# Patient Record
Sex: MALE | Race: WHITE
[De-identification: names, ages, dates, MRNs, and addresses within clinical notes are randomized per-mention and may not be internally consistent; named-entity substitution may affect disease eponyms.]

---

## 2017-02-27 NOTE — NUR
Pacemaker Information

Medtronic-Junior QUEVEDO Pacemaker

Model # [id number] Serial # [id number]

Implanted 10-27-14

Dr Stephen Silver at [phone]

[employer] [phone]

## 2017-02-27 NOTE — NUR
PMH, allergies, meds

reviewed and documented.  Preop and DOS instructions given including handouts of medications 
to stop before surgery, shower instructions and Hibiclens soap, letter from Dr Hernández, ortho 
consent, Surgical Services pamphlet, and my contact information.

## 2017-03-07 NOTE — NUR
JOINT REPLACEMENT PREOP CLASS

PATIENT ATTENDED  JOINT REPLACEMENT PREOP CLASS.  CASE MANAGEMENT CONTACT INFORMATION 
PROVIDED. EDUCATION WAS PROVIDED REGARDING WHAT TO EXPECT BEFORE, DURING AND AFTER SURGERY.  
INCLUDING:

OVERVIEW OF ANATOMY AND PHYSIOLOGY

HOSPITAL TREATMENT SCHEDULE

THERAPY DEMONSTRATION

CASE MANAGEMENT RESPONSIBILITIES

DISCHARGE PLANNING

EQUIPMENT NEEDS

JOINT REPLACEMENT WORKBOOK

ANTI-COAGULATION

SURGERY STRONG NUTRITIONAL PROTOCOL

DISCHARGE INSTRUCTIONS

[facility] PATIENT PORTAL, WITH INSTRUCTIONS

CJR AND PREOP SURVERY

PREOP BATHING- CHG GIVEN

ALL PATIENT'S QUESTIONS ANSWERED TO THEIR SATISFACTION.  PATIENTS AND COACHES ENCOURAGED TO 
CALL WITH ANY ADDITIONAL QUESTIONS OR CONCERNS.  CM FOLLOWING FOR TRANSITIONAL CARE PLANNING 
NEEDS DURING HOSPITALIZATION.

## 2017-03-28 ENCOUNTER — HOSPITAL ENCOUNTER (INPATIENT)
Dept: HOSPITAL 37 - SRG | Age: 82
LOS: 1 days | Discharge: HOME | DRG: 470 | End: 2017-03-29
Attending: ORTHOPAEDIC SURGERY | Admitting: ORTHOPAEDIC SURGERY
Payer: MEDICARE

## 2017-03-28 VITALS
HEART RATE: 67 BPM | DIASTOLIC BLOOD PRESSURE: 65 MMHG | SYSTOLIC BLOOD PRESSURE: 115 MMHG | RESPIRATION RATE: 16 BRPM | OXYGEN SATURATION: 95 %

## 2017-03-28 VITALS — DIASTOLIC BLOOD PRESSURE: 65 MMHG | OXYGEN SATURATION: 95 % | SYSTOLIC BLOOD PRESSURE: 138 MMHG | HEART RATE: 61 BPM

## 2017-03-28 VITALS
TEMPERATURE: 96.3 F | SYSTOLIC BLOOD PRESSURE: 121 MMHG | HEART RATE: 65 BPM | OXYGEN SATURATION: 92 % | DIASTOLIC BLOOD PRESSURE: 60 MMHG

## 2017-03-28 VITALS
RESPIRATION RATE: 16 BRPM | HEART RATE: 60 BPM | SYSTOLIC BLOOD PRESSURE: 91 MMHG | DIASTOLIC BLOOD PRESSURE: 50 MMHG | OXYGEN SATURATION: 98 %

## 2017-03-28 VITALS
HEART RATE: 64 BPM | DIASTOLIC BLOOD PRESSURE: 54 MMHG | OXYGEN SATURATION: 98 % | RESPIRATION RATE: 16 BRPM | SYSTOLIC BLOOD PRESSURE: 96 MMHG

## 2017-03-28 VITALS — HEART RATE: 67 BPM | RESPIRATION RATE: 16 BRPM

## 2017-03-28 VITALS
HEART RATE: 61 BPM | SYSTOLIC BLOOD PRESSURE: 89 MMHG | OXYGEN SATURATION: 100 % | TEMPERATURE: 97 F | RESPIRATION RATE: 16 BRPM | DIASTOLIC BLOOD PRESSURE: 52 MMHG

## 2017-03-28 VITALS — OXYGEN SATURATION: 95 % | HEART RATE: 62 BPM | SYSTOLIC BLOOD PRESSURE: 107 MMHG | DIASTOLIC BLOOD PRESSURE: 67 MMHG

## 2017-03-28 VITALS
OXYGEN SATURATION: 95 % | HEART RATE: 72 BPM | DIASTOLIC BLOOD PRESSURE: 53 MMHG | RESPIRATION RATE: 20 BRPM | SYSTOLIC BLOOD PRESSURE: 97 MMHG

## 2017-03-28 VITALS — DIASTOLIC BLOOD PRESSURE: 71 MMHG | HEART RATE: 65 BPM | SYSTOLIC BLOOD PRESSURE: 125 MMHG | OXYGEN SATURATION: 94 %

## 2017-03-28 VITALS
HEART RATE: 66 BPM | DIASTOLIC BLOOD PRESSURE: 53 MMHG | RESPIRATION RATE: 15 BRPM | SYSTOLIC BLOOD PRESSURE: 97 MMHG | OXYGEN SATURATION: 99 %

## 2017-03-28 VITALS
OXYGEN SATURATION: 96 % | DIASTOLIC BLOOD PRESSURE: 77 MMHG | TEMPERATURE: 97.7 F | RESPIRATION RATE: 14 BRPM | SYSTOLIC BLOOD PRESSURE: 160 MMHG | HEART RATE: 82 BPM

## 2017-03-28 VITALS — RESPIRATION RATE: 16 BRPM | OXYGEN SATURATION: 97 % | HEART RATE: 68 BPM

## 2017-03-28 VITALS
OXYGEN SATURATION: 98 % | DIASTOLIC BLOOD PRESSURE: 55 MMHG | RESPIRATION RATE: 16 BRPM | HEART RATE: 64 BPM | TEMPERATURE: 97.5 F | SYSTOLIC BLOOD PRESSURE: 120 MMHG

## 2017-03-28 VITALS
HEART RATE: 62 BPM | DIASTOLIC BLOOD PRESSURE: 51 MMHG | OXYGEN SATURATION: 99 % | SYSTOLIC BLOOD PRESSURE: 94 MMHG | RESPIRATION RATE: 22 BRPM

## 2017-03-28 VITALS
SYSTOLIC BLOOD PRESSURE: 100 MMHG | RESPIRATION RATE: 22 BRPM | HEART RATE: 61 BPM | OXYGEN SATURATION: 98 % | DIASTOLIC BLOOD PRESSURE: 52 MMHG

## 2017-03-28 VITALS
HEART RATE: 66 BPM | OXYGEN SATURATION: 100 % | RESPIRATION RATE: 19 BRPM | SYSTOLIC BLOOD PRESSURE: 93 MMHG | DIASTOLIC BLOOD PRESSURE: 55 MMHG

## 2017-03-28 VITALS
BODY MASS INDEX: 24.04 KG/M2 | WEIGHT: 177.47 LBS | HEIGHT: 72 IN | BODY MASS INDEX: 24.04 KG/M2 | WEIGHT: 177.47 LBS | HEIGHT: 72 IN

## 2017-03-28 VITALS
RESPIRATION RATE: 20 BRPM | OXYGEN SATURATION: 96 % | HEART RATE: 64 BPM | DIASTOLIC BLOOD PRESSURE: 59 MMHG | SYSTOLIC BLOOD PRESSURE: 109 MMHG

## 2017-03-28 VITALS — SYSTOLIC BLOOD PRESSURE: 141 MMHG | DIASTOLIC BLOOD PRESSURE: 69 MMHG | OXYGEN SATURATION: 96 % | HEART RATE: 62 BPM

## 2017-03-28 VITALS
SYSTOLIC BLOOD PRESSURE: 94 MMHG | DIASTOLIC BLOOD PRESSURE: 55 MMHG | RESPIRATION RATE: 14 BRPM | HEART RATE: 63 BPM | OXYGEN SATURATION: 96 %

## 2017-03-28 VITALS
SYSTOLIC BLOOD PRESSURE: 112 MMHG | RESPIRATION RATE: 16 BRPM | OXYGEN SATURATION: 93 % | DIASTOLIC BLOOD PRESSURE: 65 MMHG | HEART RATE: 73 BPM

## 2017-03-28 VITALS — OXYGEN SATURATION: 98 % | RESPIRATION RATE: 20 BRPM | HEART RATE: 64 BPM

## 2017-03-28 VITALS
OXYGEN SATURATION: 100 % | HEART RATE: 63 BPM | DIASTOLIC BLOOD PRESSURE: 58 MMHG | RESPIRATION RATE: 20 BRPM | SYSTOLIC BLOOD PRESSURE: 90 MMHG

## 2017-03-28 VITALS — HEART RATE: 61 BPM | SYSTOLIC BLOOD PRESSURE: 126 MMHG | DIASTOLIC BLOOD PRESSURE: 67 MMHG | OXYGEN SATURATION: 92 %

## 2017-03-28 VITALS
SYSTOLIC BLOOD PRESSURE: 84 MMHG | RESPIRATION RATE: 18 BRPM | OXYGEN SATURATION: 99 % | HEART RATE: 62 BPM | DIASTOLIC BLOOD PRESSURE: 54 MMHG

## 2017-03-28 VITALS — HEART RATE: 65 BPM | DIASTOLIC BLOOD PRESSURE: 66 MMHG | SYSTOLIC BLOOD PRESSURE: 149 MMHG | OXYGEN SATURATION: 94 %

## 2017-03-28 VITALS — TEMPERATURE: 97 F

## 2017-03-28 DIAGNOSIS — I12.9: ICD-10-CM

## 2017-03-28 DIAGNOSIS — E05.90: ICD-10-CM

## 2017-03-28 DIAGNOSIS — I48.2: ICD-10-CM

## 2017-03-28 DIAGNOSIS — E78.5: ICD-10-CM

## 2017-03-28 DIAGNOSIS — Z95.0: ICD-10-CM

## 2017-03-28 DIAGNOSIS — N18.1: ICD-10-CM

## 2017-03-28 DIAGNOSIS — M16.11: Primary | ICD-10-CM

## 2017-03-28 DIAGNOSIS — K21.9: ICD-10-CM

## 2017-03-28 DIAGNOSIS — J30.9: ICD-10-CM

## 2017-03-28 DIAGNOSIS — Z87.891: ICD-10-CM

## 2017-03-28 LAB
ALBUMIN/GLOBULIN RATIO: 1.1 RATIO (ref 1.1–2.2)
ALBUMIN: 3.9 G/DL (ref 3.5–5)
ALP SERPL-CCNC: 91 U/L (ref 38–126)
ALT (SGPT): 34 U/L (ref 21–72)
ANION GAP: 8 MEQ/L (ref 5–15)
AST (SGOT): 24 U/L (ref 17–59)
BASOPHILS # (AUTO): 0.1 T/MM3 (ref 0–0.2)
BASOPHILS % (AUTO): 1 % (ref 0–2)
BILIRUBIN,TOTAL: 1.1 MG/DL (ref 0.2–1.3)
BUN SERPL-MCNC: 34 MG/DL (ref 9–20)
BUN/CREATININE RATIO: 20 RATIO (ref 6–26)
CALCIUM SERPL-MCNC: 9.6 MG/DL (ref 8.4–10.2)
CHLORIDE SERPL-SCNC: 109 MEQ/L (ref 98–107)
CO2 - CARBON DIOXIDE: 27 MEQ/L (ref 22–30)
CREAT SERPL-MCNC: 1.7 MG/DL (ref 0.8–1.5)
EOSINOPHILS # (AUTO): 0.2 T/MM3 (ref 0–0.5)
EOSINOPHILS % (AUTO): 2.2 % (ref 0–4)
GFR SERPL CREATININE-BSD FRML MDRD: 39 ML/MIN/{1.73_M2}
GLOBULIN: 3.4 G/DL (ref 2.4–3.6)
GLUCOSE SERPL-MCNC: 94 MG/DL (ref 75–110)
HCT - HEMATOCRIT: 40.4 % (ref 41–53)
HGB - HEMOGLOBIN: 13.1 GM/DL (ref 13.5–17.5)
IMMATURE GRANULOCYTE # (AUTO): 0.03 T/MM3 (ref 0–0.03)
IMMATURE GRANULOCYTE % (AUTO): 0.4 % (ref 0–0.5)
LYMPHOCYTES # (AUTO): 1.5 T/MM3 (ref 1–4.8)
LYMPHOCYTES % (AUTO): 22.8 % (ref 23–45)
MEAN CORPUSCULAR HGB CONC(MCHC: 32.4 GM/DL (ref 31–37)
MEAN CORPUSCULAR HGB: 31.5 UUG (ref 26–34)
MEAN CORPUSCULAR VOLUME: 97.1 UM3 (ref 80–100)
MEAN PLATELET VOLUME: 12.1 UM3 (ref 9.4–12.4)
MONOCYTES # (AUTO): 0.6 T/MM3 (ref 0–0.8)
MONOCYTES % (AUTO): 9.3 % (ref 0–9)
NEUTROPHILS #(AUTO)-ABSOLUTE: 4.3 T/MM3 (ref 1.8–7.7)
NEUTROPHILS NFR BLD AUTO: 64.3 % (ref 33–66)
POTASSIUM: 4.6 MEQ/L (ref 3.6–5)
PROT SERPL-MCNC: 7.3 G/DL (ref 6.3–8.2)
RDW STANDARD DEVIATION: 41 FL (ref 36.9–50.2)
RED BLOOD COUNT: 4.16 M/MM3 (ref 4.5–5.9)
SODIUM SERPL-SCNC: 144 MEQ/L (ref 134–144)
WBC - WHITE BLOOD COUNT: 6.7 T/MM3 (ref 4.5–11)

## 2017-03-28 PROCEDURE — 80053 COMPREHEN METABOLIC PANEL: CPT

## 2017-03-28 PROCEDURE — 80048 BASIC METABOLIC PNL TOTAL CA: CPT

## 2017-03-28 PROCEDURE — 85025 COMPLETE CBC W/AUTO DIFF WBC: CPT

## 2017-03-28 PROCEDURE — 0SR902A REPLACEMENT OF RIGHT HIP JOINT WITH METAL ON POLYETHYLENE SYNTHETIC SUBSTITUTE, UNCEMENTED, OPEN APPROACH: ICD-10-PCS | Performed by: ORTHOPAEDIC SURGERY

## 2017-03-28 PROCEDURE — 85027 COMPLETE CBC AUTOMATED: CPT

## 2017-03-28 PROCEDURE — 36415 COLL VENOUS BLD VENIPUNCTURE: CPT

## 2017-03-28 RX ADMIN — ACETAMINOPHEN SCH MG: 325 TABLET, FILM COATED ORAL at 20:36

## 2017-03-28 RX ADMIN — Medication SCH MG: at 20:36

## 2017-03-28 RX ADMIN — CLINDAMYCIN PHOSPHATE SCH MLS/HR: 18 INJECTION, SOLUTION INTRAVENOUS at 18:58

## 2017-03-28 RX ADMIN — Medication SCH EACH: at 20:47

## 2017-03-28 RX ADMIN — FLUTICASONE PROPIONATE SCH SPRAY: 50 SPRAY, METERED NASAL at 20:47

## 2017-03-28 RX ADMIN — DOCUSATE CALCIUM SCH MG: 240 CAPSULE, LIQUID FILLED ORAL at 20:38

## 2017-03-28 RX ADMIN — ACETAMINOPHEN SCH MG: 325 TABLET, FILM COATED ORAL at 17:55

## 2017-03-28 RX ADMIN — SODIUM CHLORIDE SCH MLS/HR: 0.9 INJECTION, SOLUTION INTRAVENOUS at 14:49

## 2017-03-28 NOTE — NUR
ADMISSION

PT TO ROOM 123 PER CART. TRANSFERRED WITH ASSIST OF 2 WITH SLIDE BOARD. PT UNABLE TO FEEL 
FEET, HAS SOME FEELING AT ANKLES. PT DENIES PAIN. DENIES SOA. ON RA, TOLERATING WELL. VS 
STABLE. PT IN BED WITH ALARM. CALL LIGHT WITHIN REACH. WILL CONTINUE TO MONITOR.

## 2017-03-28 NOTE — XMS REPORT
Patient Summary (HL7 CCD)

 Created on: 02/15/2017



ALEXISNILTONTAMMIE

External Reference #: [id number]

: 1933

Sex: Male



Demographics







 Address  

[place]  [address]

 

 Home Phone  [phone]

 

 Preferred Language  English

 

 Marital Status  Unknown

 

 Anabaptist Affiliation  Unknown

 

 Race  Unknown

 

 Ethnic Group  Unknown





Author







 Author  SULEIMAN CALDWELL

 

 Organization  Unknown

 

 Address  [address] [place] [address]  [phone]



 

 Phone  0







Care Team Providers







 Care Team Member Name  Role  Phone

 

 MARTHA CLEVELAND  Attending Physician  [phone]



                                            



Vital Signs

          Unknown or Not Available.                                            
                        



Allergies

                      





 Allergy                    Code                    Allergy Type               
     Reaction                    Status                

 

 PCN (penicillin)                    0                    Drug allergy         
                                Active                

 

 SULFA (sulfonamide)                    0                    Drug allergy      
                                   Active                



                                                                               
                   



Procedures

          Unknown or Not Available.                                            
                                  



History of Immunizations

          Unknown or Not Available.                                            
                                  



Problems

          Unknown or Not Available.                                            
                                            



Results

          Unknown or Not Available.                                            
                                            



Active Medications

          Unknown or Not Available.                                            
                        



Medications Administered During Visit

          Unknown or Not Available.                                            
                        



Encounters

          Unknown or Not Available.                                            
              



Social History

                      





 Smoking Status                    Code                    Start Date          
          End Date                

 

 Former smoker                    [id number]                                      
                    



                                                                              



Patient Decision Aids

          Unknown or Not Available.                                            
              



Discharge Instructions

                      

            





                     



You were admitted to [facility] on 02/10/2017 14:10         
           



You were discharged from [facility] on 02/10/2017 14:10     
               



Should you have any questions prior to discharge, please contact a member of 
your healthcare team. If you have left the hospital and have any questions, 
please contact your primary care physician.                                  



                                                                    



Chief Complaint and Reason For Visit

                      





 Chief Complaint                    Date of Onset                

 

 XR HIP                                     



                                                                    



Function Status

          Unknown or Not Available.                                            
              



Plan of Care

          Unknown or Not Available.                                            
              



Referral/Transition of Care

          Unknown or Not Available.

## 2017-03-28 NOTE — XMS REPORT
Continuity of Care Document

 Created on: 2017



TAMMIE TORO

External Reference #: [id number]

: 1933

Sex: Male



Demographics







 Preferred Language  Unknown

 

 Marital Status  Unknown

 

 Church Affiliation  Unknown

 

 Race  Unknown

 

 Ethnic Group  Unknown





Author







 Author  [facility]

 

 Organization  [facility]

 

 Address  Unknown

 

 Phone  Unavailable



                                                      



Allergies

                                                                               
         



Medications

                                                                               
         



Problems

                                                                               
         



Procedures

                                                                               
         



Results

                                                                    



Encounters

                      





 ACCT No.                    Visit Date/Time                    Discharge      
              Status                    Pt. Type                    Provider   
                 Facility                    Loc./Unit                    
Complaint                

 

 [id number]                    2017 15:11:00                       
                  [facility]                    Unknown                               
                                                                     

 

 [id number]                    2014 08:28:00                       
                  [facility]                    Unknown                               
                                                                     

 

 [id number]                    2014 09:49:00                       
                  [facility]                    Unknown                               
                                                                     

 

 [id number]                    10/22/2013 10:13:00                       
                  [facility]                    Unknown

## 2017-03-28 NOTE — PDOPERATE
Operative Report


Date of Operation


3/28/17


Side:  Right


Preoperative Diagnosis:  hip primary DJD


Postoperative Diagnosis


Same as preoperative diagnosis.


Operation/Procedure:  total hip arthroplasty (right)


Surgeon


DANIEL Hernández MD


Assistant


VANE Queen


Complications


None.


Regional Block:  Spinal


Estimated Blood Loss


See Anesthesia Record.


Fluids


Please See Anesthesia Record.


Description of Operation


Mr. Melo and his right hip were identified and marked in the the 

preoperative holding area. He was then brought back to the operating suite and 

proper anesthesia was administered. He was then positioned lateral on the 

operating table. The right lower extremity was then prepped and draped in my 

normal sterile fashion. Timeout was performed with all operating room personnel.





A posterior approach was utilized. Approximately 11 cm incision was made in the 

skin and dissection carried down to the muscle fascia which was then split in 

line with skin incision. Charnley retractor was placed and the short external 

rotators were identified and tagged and detached. Capsulotomy was performed and 

the hip dislocated. A femoral neck osteotomy was performed approximately 1 cm 

proximal to the lesser trochanter. The head was removed and acetabulum exposed. 

Labrum was removed and sequentially reamed to a size 57 and placed a 58 cup in 

20 of anteversion. A liner was then placed. The proximal femur was exposed and 

prepared with a cookie cutter followed by reaming and broaching to a size 10. 

We trialed the standard head this gave excellent stability and good leg length. 

After thorough irrigation a final Secure Fit Max size 10 stem with 132 neck was 

placed. We again trialed this time with a -2.5 head and this again gave 

excellent stability and good leg length. A final 36 mm -2-1/2 metal head was 

placed and the hip reduced. Betadine solution was allowed to sit in the wound 

for 3 minutes and fully irrigated out with normal saline. Joint cocktail was 

injected throughout soft tissue. The capsulotomy was repaired with Ethibond. 

Short external rotators were also repaired with Ethibond. 1 g of TXA was placed 

into the hip joint allowed to sit for 5 minutes. 1 g of vancomycin powder was 

placed into the wound. The muscle fascia was then repaired with #1 Vicryl. I 

then left my system to close the subcutaneous tissue with 2-0 Vicryl followed 

by running 4-0 Monocryl skin followed by Dermabond and a sterile dressing. The 

patient with any placed back into supine position and taken to recovery room in 

the care of anesthesia.











STEPHANIE HERNÁNDEZ MD Mar 28, 2017 14:04

## 2017-03-28 NOTE — XMS REPORT
Patient Summary (HL7 CCD)

 Created on: 10/22/2013



ALEXISTAMMIE CALLAWAY NINI

External Reference #: [id number]

: 1933

Sex: Male



Demographics







 Address  

[place]  [address]

 

 Home Phone  [phone]

 

 Preferred Language  English

 

 Marital Status  Unknown

 

 [employer]ar[employer] Affiliation  Unknown

 

 Race  Unknown

 

 Ethnic Group  Unknown





Author







 Author  ERIC BROWN

 

 Organization  Unknown

 

 Address  [address], [place]  [phone]



 

 Phone  0







Care Team Providers







 Care Team Member Name  Role  Phone

 

 CR PUTNAM, W  Attending Physician  [phone]



                                            



Vital Signs

          Unknown.                                                             
       



Allergies

          Unknown.                                                             
                 



Procedures

          Unknown.                                                             
                 



History of Immunizations

          Unknown.                                                             
                 



Problems

          Unknown.                                                             
                           



Results

          Unknown.                                                             
                           



Medications

          Unknown.                                                             
       



Medications Administered

          Unknown.                                                             
       



Encounters

          Unknown.                                                          



Social History

                      





 Smoking Status                    Code                    Start Date          
          End Date                

 

 Former smoker                    [id number]                                      
                    



                                                                              



Patient Decision Aids

          Unknown.                                                          



Instructions

                      





                     



You were admitted to [facility] on 10/18/2013.             
       



You were discharged from [facility] on 10/18/2013.         
           



Should you have any questions prior to discharge, please contact a member of 
your healthcare team. If you have left the hospital and have any questions, 
please contact your primary care physician.                                  



                                                                    



Chief Complaint and Reason For Visit

                      





 Chief Complaint                    Date of Onset                

 

 CT SINUSES                                     



                                                                    



Function Status

          Unknown.                                                          



Plan of Care

          Unknown.

## 2017-03-28 NOTE — ANESPREOP
Anesthesia Record


Date and Time


DATE: 3/28/17  TIME: 11:22


Pre-Op Diagnosis


right hip degeneration


Proposed Surgical Procedure


RT TAM


NPO since:  midnight


Allergies:  


Coded Allergies:  


     Penicillins (Verified  Allergy, Unknown, ANAPHYLAXIS, 3/28/17)


     Sulfa (Sulfonamide Antibiotics) (Verified  Allergy, Unknown, RASH, 3/28/17)


Ht/Wt/BMI


Height: 6 ' 0.00 "


Weight: 80.200 kg


BMI: 24.0 kg/m2





 Vital Signs








  Date Time  Temp Pulse Resp B/P Pulse Ox O2 Delivery O2 Flow Rate FiO2


 


3/28/17 10:46 97.7 82 14 160/77 96 Room Air  








Medications


Inpatient Medications





 Current Medications








 Medications


  (Trade)  Dose


 Ordered  Sig/Madhav  Start Time


 Stop Time Status Last Admin


Dose Admin


 


 Sodium Chloride


  (Normal Saline


 IV)  1,000 ml @ 


 50 mls/hr  Q20H PRN  3/28/17 07:00


     


 








Amiodarone HCl (Amiodarone HCl) 200 Mg Tablet, 100 MG PO DAILY, (Reported)


   Last Taken:  on 3/28/17 0800  Ascorbate Calcium (Vitamin C) 500 Mg Tablet, 1 

TAB PO DAILY, (Reported)


   Last Taken:  on 3/27/17 0800  Aspirin *EC* (Low Dose Aspirin EC) 81 Mg 

Tablet.dr, 1 TAB PO DAILY, (Reported)


   Last Taken:  on 3/21/17   Docusate Calcium (Stool Softener) 240 Mg Capsule, 

1 CAP PO BID, (Reported)


   Last Taken:  on 3/27/17 2130  Fluticasone Propionate (Flonase Allergy Relief 

50 mcg/actuation Nasal) 9.9 Ml Spray.susp, 1 SPRAY EA NOSTRIL BID, (Reported)


   Last Taken:  on 3/21/17   Ibuprofen (Ibuprofen) 200 Mg Tablet, 2 TAB PO Q4H 

PRN for PAIN, (Reported)


   Last Taken:  on 3/23/17   Levothyroxine Sodium (Levothyroxine Sodium) 100 

Mcg Tablet, 1 TAB PO ACB, (Reported)


   Once daily before breakfast 


   Last Taken:  on 3/27/17 0800  Loratadine (Loratadine) 10 Mg Tablet, 10 MG PO 

ACB, (Reported)


   Take 1 tablet, by mouth, one time a day (before breakfast). 


   Last Taken:  on 3/27/17 0800  Metoprolol Tartrate (Metoprolol Tartrate) 25 

Mg Tablet, 25 MG PO BIDWM, (Reported)


   Take 1 tab, by mouth, two time a day with meals. 


   Last Taken:  on 3/28/17 0800  Mirtazapine (Mirtazapine) 30 Mg Tablet, 30 MG 

PO HS, (Reported)


   Take 1 tablet, by mouth, one time a day (at bedtime). 


   Last Taken:  on 3/27/17 2130  [place]-3S/Dha/Epa/Fish Oil (Fish Oil 1,200 mg 

Softgel) 1 Each Capsule, 1,200 MG PO DAILY, (Reported)


   Last Taken:  on 3/23/17   Omeprazole (Omeprazole) 20 Mg Capsule.dr, 20 MG PO 

ACB, (Reported)


   Take 1 capsule, by mouth, one time a day (before breakfast). 


   Last Taken:  on 3/28/17 0800  Pravastatin Sodium (Pravastatin Sodium) 40 Mg 

Tablet, 20 MG PO HS, (Reported)


   Take 1 tablet, by mouth, daily at bedtime. 


   Last Taken:  on 3/27/17 2130  Tamsulosin HCl (Tamsulosin HCl) 0.4 Mg 

Cap.er.24h, 0.4 MG PO HS, (Reported)


   Take 1 capsule, by mouth, 1 time a day (at BEDTIME). 


   Last Taken:  on 3/27/17 2130  Currently on Beta Blocker:  No





Medical/Surgical History


Anesthesia PMH:  Reports: *Hypertension, Cardiac Arrythmia (Hx of A fib), 

Hyperlipidemia, Pacemaker, Reflux, Renal Disease (PER H&P), Sleep Apnea, 

Thyroid Disease (SUBCLINICAL HYPERTHYROIDISM PER H&P), Denies: Anesthesia 

Reactions, Cancer, Glaucoma, Malignant Hyperthermia


Smoking Status:  Former smoker


Has pt. smoked today?:  No


Use Chewing Tobacco?:  No


Second Hand Exposure:  No


Substance Use Type:  does not use


Alcohol Intake:  none


Past Surgical History


Orthopedic Surgeries: Yes - LT RCR





Abdominal Surgeries: Yes - APPY 





Genitourinary Surgeries:  





Cardiac Surgeries:  





Endocrine Surgeries:  





Reproductive Surgeries:  





Neurological Surgeries:  





Ear Surgeries:  





Nose Surgeries:  





Throat Surgeries:  





Other Surgeries: Yes - COLONOSCOPY


Anesthesia Adverse Reactions:  FOUND none


Family Hx of Anesthesia Advers:  none


Hx of Motion Sickness:  No





Pertinent Findings


EKG Rhythm:  Sinus Rhythm, Paced





Physical Exam


Respiratory:  Lungs clear


Cardiovascular:  FOUND Regular rate, rhythm, FOUND Pacemaker





Airway Assessment


Mallampati Score:  II


TMD:  3 Fingerbreadths


Neck Extension:  Fair


Overall Assessment:  May Be Diff Intubation


ASA:  3





Plan


Regional:  Spinal





Discussion


Discussed risks/options/alternatives of anesthesia and questions answered.  

Patient consents.  Nursing pain assessment noted.


Present: Family Member


Attestation Statement


Prior to the delivery of any anesthetic medication, I examined the patient, 

developed the plan, obtained the patient's consent and discussed the risk and 

benefits of the procedure with the patient/guardian.











DENILSON PEARL CRNA Mar 28, 2017 11:24

## 2017-03-28 NOTE — DI
Indication: ITS.REASON: post right hip replacement



PROCEDURE: PELVIS W/1 VIEW RT HIP: 



Encounter: Initial



Comparison: None



Findings:  Postoperative changes of right total hip replacement are seen. 

There is expected postoperative subcutaneous gas.  No evidence of hardware

failure or acute fracture. No retained radiopaque surgical instruments or

sponges seen.



Impression: New right total hip prosthesis without evidence of immediate

complication. 



Electronically Signed by Dr. Nirav Oconnor on 3/28/2017 3:04 PM.

## 2017-03-28 NOTE — NUR
CM



CM ATTEMPTED VISIT.  PT IS AT PROCEDURE.  NO FAMILY IS PRESENT IN THE ROOM.  CM CONTACT 
INFORMATION IS LEFT AT THE BEDSIDE.

## 2017-03-28 NOTE — XMS REPORT
Patient Summary (HL7 CCD)

 Created on: 10/28/2014



TAMMIE TORO

External Reference #: [id number]

: 1933

Sex: Male



Demographics







 Address  

[place]  [address]

 

 Home Phone  [phone]

 

 Preferred Language  English

 

 Marital Status  Unknown

 

 [employer] Affiliation  Unknown

 

 Race  Unknown

 

 Ethnic Group  Unknown





Author







 NICK Diggs

 

 Organization  Unknown

 

 Address  [address] [place] [address]  [phone]



 

 Phone  0







Care Team Providers







 Care Team Member Name  Role  Phone

 

 REBECCA, A  Attending Physician  0

 

 REBECCA, A  Primary Surgeon  0



                                            



Vital Signs

          Unknown or Not Available.                                            
                        



Allergies

                      





 Allergy                    Code                    Allergy Type               
     Reaction                    Status                

 

 PCN (penicillin)                    0                    Drug allergy         
                                Active                

 

 SULFA (sulfonamide)                    0                    Drug allergy      
                                   Active                



                                                                               
                   



Procedures

          Unknown or Not Available.                                            
                                  



History of Immunizations

          Unknown or Not Available.                                            
                                  



Problems

          Unknown or Not Available.                                            
                                            



Results

                      

            





 CARDIAC PANEL - Collect Date/Time: 10/25/2014 10:20                 

 

 Test Name                    Code                    Test Result              
      Test Units                    Test Ref Range                

 

 CKMB                                         1.2                    ng/mL     
               L=0.0      H=3.6                

 

 CPK                                         98                    U/L         
           L=26       H=308                

 

 CKMB%                                         1.2                    %        
            L=0.0      H=4.0                

 

 TROPONIN I                                         <0.02                    ng/
mL                    L=0.00     H=0.05                



            

            





 COMP METABOLIC - Collect Date/Time: 10/25/2014 10:20                 

 

 Test Name                    Code                    Test Result              
      Test Units                    Test Ref Range                

 

 GLUCOSE                                         108                    mg/dL  
                  L=70       H=110                

 

 BUN                                         54                    mg/dL       
             L=7        H=18                

 

 CREATININE                                         2.40                    mg/
dL                    L=0.60     H=1.30                

 

 AGE                                         81                    YEARS       
                              

 

 GFR                                         27.8                              
                            

 

 SODIUM                                         142                    mmol/L  
                  L=136      H=145                

 

 POTASSIUM                                         4.4                    mmol/
L                    L=3.5      H=5.1                

 

 CHLORIDE                                         108                    mmol/L
                    L=98       H=107                

 

 CO2                                         23                    mmol/L      
              L=21       H=32                

 

 CALCIUM                                         8.5                    mg/dL  
                  L=8.5      H=10.1                

 

 AST                                         18                    U/L         
           L=15       H=37                

 

 ALT                                         43                    U/L         
           L=12       H=78                

 

 ALKALINE PHOS                                         69                    U/
L                    L=46       H=116                

 

 TOTAL PROTEIN                                         6.9                    g/
dL                    L=6.4      H=8.2                

 

 ALBUMIN                                         3.5                    g/dL   
                 L=3.4      H=5.0                

 

 TOTAL BILI                                         0.60                    mg/
dL                    L=0.00     H=1.00                



            

            





 PRO B-TYPE NATRIURETIC PEPTIDE - Collect Date/Time: 10/25/2014 10:20          
       

 

 Test Name                    Code                    Test Result              
      Test Units                    Test Ref Range                

 

 PBNP                                         6869                    pg/mL    
                L=0        H=450                



            

            





 CBC W/ DIFF - Collect Date/Time: 10/25/2014 10:20                 

 

 Test Name                    Code                    Test Result              
      Test Units                    Test Ref Range                

 

 WBC                                         9.0                    x10^3      
              L=4.8      H=10.8                

 

 RBC                                         4.00                    x10^6     
               L=4.70     H=6.10                

 

 HEMOGLOBIN                                         13.2                    g/
dL                    L=14.0     H=18.0                

 

 HEMATOCRIT                                         37.8                    %  
                  L=42.0     H=52.0                

 

 MCV                                         95                    fL          
          L=80       H=100                

 

 MCH                                         32.9                    pg        
            L=27.0     H=33.0                

 

 MCHC                                         34.8                    g/dL     
               L=33.0     H=37.0                

 

 RDW                                         12.2                    %         
           L=11.5     H=14.5                

 

 PLATELETS                                         138                    x10^3
                    L=150      H=450                

 

 MPV                                         11.6                    fL        
            L=7.8      H=11.0                

 

 NEUTROPHILS                                         82.8                    % 
                   L=40.0     H=80.0                

 

 LYMPHOCYTES                                         9.9                    %  
                  L=20.0     H=45.0                

 

 MONOCYTES                                         5.5                    %    
                L=0.0      H=10.0                

 

 EOSINOPHILS                                         1.8                    %  
                  L=0.0      H=5.0                

 

 BASOPHILS                                         0.0                    %    
                L=0.0      H=2.0                

 

 SEG                                         79                    %%          
          L=40       H=80                

 

 BAND                                         1                    %%          
          L=0        H=5                

 

 LYMPH                                         9                    %%         
           L=20       H=45                

 

 MONO                                         8                    %%          
          L=0        H=10                

 

 EOS                                         2                    %%           
         L=0        H=5                

 

 BASO                                         1                    %%          
          L=0        H=2                

 

 ATYP LYMPH                                         0                    %%    
                L=0        H=10                

 

 META                                         0                    %%          
          L=0        H=1                

 

 REFLEX MAN DIFF                                         YES                    
N/A                                     

 

 RBC MORPHOLOGY                                         NORMAL                 
   N/A                                     



                                                                               
                                                 



Medications

                      





 Medication                    Code                    Dose                    
Units                    Frequency                    Route                    
Modification                    Start Date/Time                    Stop Date/
Time                

 

 Pravastatin Sodium 20MG Oral Tablet                    873666                 
   20                    MILLIGRAMS                    DAILY                    
ORAL                                                                           
  

 

 Fish Oil 1000MG Oral Capsule, Liquid Filled                    [id number]         
           1000                    MILLIGRAMS                    DAILY         
           ORAL                                                                
             

 

 Vitamin C 500MG Oral Tablet                    167977                    500  
                  MILLIGRAMS                    DAILY                    ORAL  
                                                                           

 

 Glucosamine & Chondroitin 400MG-500MG Oral Capsule                    532606  
                  1                    EACH                    DAILY           
         ORAL                                                                  
           

 

 Aspirin 81MG Oral Tablet                    668674                    81      
              MILLIGRAMS                    DAILY                    ORAL      
                                                                       

 

 Synthroid 0.05MG Oral Tablet                    843563                    0.05
                    MILLIGRAMS                    DAILY                    ORAL
                                                                             

 

 Omeprazole 20MG Oral Capsule, Delayed Release                    [id number]       
             20                    MILLIGRAMS                    DAILY         
           ORAL                                                                
             

 

 Lisinopril 20MG Oral Tablet                    [id number]                    20   
                 MILLIGRAMS                    DAILY                    ORAL   
                                                                          

 

 Propranolol HCl 40MG Oral Tablet                    [id number]                    
40                    MILLIGRAMS                    DAILY                    
ORAL                                                                           
  

 

 Tamsulosin Hydrochloride 0.4MG Oral Capsule                    297342         
           0.4                    MILLIGRAMS                    DAILY          
          ORAL                                                                 
            



                                                                               
                                                                               
          



Medications Administered

          Unknown or Not Available.                                            
                        



Encounters

          Unknown or Not Available.                                            
              



Social History

                      





 Smoking Status                    Code                    Start Date          
          End Date                

 

 Former smoker                    [id number]                                      
                    



                                                                              



Patient Decision Aids

          Unknown or Not Available.                                            
              



Discharge Instructions

                      





                     



You were admitted to [facility] on 10/25/2014.             
       



You were discharged from [facility] on 10/25/2014.         
           



Should you have any questions prior to discharge, please contact a member of 
your healthcare team. If you have left the hospital and have any questions, 
please contact your primary care physician.                                  



                                                                    



Chief Complaint and Reason For Visit

          Unknown or Not Available.                                            
              



Function Status

          Unknown or Not Available.                                            
              



Plan of Care

          Unknown or Not Available.                                            
              



Referral/Transition of Care

          Unknown or Not Available.

## 2017-03-28 NOTE — ANESPO
Post-Op Note


Date


3/28/17


Time:  14:45


Status


Pt Participated in Evaluation:  Pt participated in person





 Vital Signs








  Date Time  Temp Pulse Resp B/P Pulse Ox O2 Delivery O2 Flow Rate FiO2


 


3/28/17 14:20 97.0 61 16 89/52 100 Mask 6.00 








Respiratory Function:  Airway patent


Cardiovascular Function:  Regular pulse


Mental Status:  Alert/oriented


Pain Level Intensity:  0


Unable to Assess Pain Due To:  pre-op order


Hydration:  IV infusing


Complications during Recovery


None apparent





Follow-Up Instructions


Instructions


Per Surgeon











JORJE LOVETT CRNA Mar 28, 2017 14:51

## 2017-03-28 NOTE — NUR
STATUS

PT A/O X3. UP WITH ASSIST OF ONE WITH WALKER AND GAIT BELT. PT STATES PAIN IS RATED 4/10 AND 
"REALLY ISN'T BAD".  VS STABLE. PT ON RA, TOLERATING WELL. HAS NOT VOIDED POST OP. 
TOLERATING REGULAR DIET WELL. PT UP IN CHAIR WITH ALARM.  CALL LIGHT WITHIN REACH. WILL 
CONTINUE TO MONITOR.

## 2017-03-29 VITALS
TEMPERATURE: 97.8 F | RESPIRATION RATE: 16 BRPM | OXYGEN SATURATION: 95 % | DIASTOLIC BLOOD PRESSURE: 69 MMHG | HEART RATE: 62 BPM | SYSTOLIC BLOOD PRESSURE: 105 MMHG

## 2017-03-29 VITALS — HEART RATE: 90 BPM | RESPIRATION RATE: 20 BRPM

## 2017-03-29 VITALS
TEMPERATURE: 98.2 F | HEART RATE: 62 BPM | SYSTOLIC BLOOD PRESSURE: 106 MMHG | OXYGEN SATURATION: 94 % | DIASTOLIC BLOOD PRESSURE: 60 MMHG | RESPIRATION RATE: 16 BRPM

## 2017-03-29 VITALS — HEART RATE: 61 BPM | RESPIRATION RATE: 18 BRPM

## 2017-03-29 VITALS
TEMPERATURE: 96.5 F | RESPIRATION RATE: 18 BRPM | OXYGEN SATURATION: 95 % | DIASTOLIC BLOOD PRESSURE: 64 MMHG | SYSTOLIC BLOOD PRESSURE: 127 MMHG | HEART RATE: 61 BPM

## 2017-03-29 LAB
ANION GAP: 10 MEQ/L (ref 5–15)
ANION GAP: 11 MEQ/L (ref 5–15)
BUN SERPL-MCNC: 39 MG/DL (ref 9–20)
BUN SERPL-MCNC: 39 MG/DL (ref 9–20)
BUN/CREATININE RATIO: 21 RATIO (ref 6–26)
BUN/CREATININE RATIO: 22 RATIO (ref 6–26)
CALCIUM SERPL-MCNC: 8.4 MG/DL (ref 8.4–10.2)
CALCIUM SERPL-MCNC: 8.6 MG/DL (ref 8.4–10.2)
CHLORIDE SERPL-SCNC: 112 MEQ/L (ref 98–107)
CHLORIDE SERPL-SCNC: 113 MEQ/L (ref 98–107)
CO2 - CARBON DIOXIDE: 20 MEQ/L (ref 22–30)
CO2 - CARBON DIOXIDE: 22 MEQ/L (ref 22–30)
CREAT SERPL-MCNC: 1.8 MG/DL (ref 0.8–1.5)
CREAT SERPL-MCNC: 1.9 MG/DL (ref 0.8–1.5)
EOSINOPHILS # (MANUAL): 0.2 T/MM3 (ref 0–0.5)
EOSINOPHILS % (MANUAL): 1 % (ref 0–4)
GFR SERPL CREATININE-BSD FRML MDRD: 34 ML/MIN/{1.73_M2}
GFR SERPL CREATININE-BSD FRML MDRD: 36 ML/MIN/{1.73_M2}
GLUCOSE SERPL-MCNC: 124 MG/DL (ref 75–110)
GLUCOSE SERPL-MCNC: 134 MG/DL (ref 75–110)
HCT - HEMATOCRIT: 33.1 % (ref 41–53)
HCT - HEMATOCRIT: 34.4 % (ref 41–53)
HGB - HEMOGLOBIN: 10.6 GM/DL (ref 13.5–17.5)
HGB - HEMOGLOBIN: 11.1 GM/DL (ref 13.5–17.5)
LYMPHOCYTES # (MANUAL): 0.7 T/MM3 (ref 1–4.8)
LYMPHOCYTES % (MANUAL): 4 % (ref 23–45)
MEAN CORPUSCULAR HGB CONC(MCHC: 32 GM/DL (ref 31–37)
MEAN CORPUSCULAR HGB CONC(MCHC: 32.3 GM/DL (ref 31–37)
MEAN CORPUSCULAR HGB: 31.2 UUG (ref 26–34)
MEAN CORPUSCULAR HGB: 31.3 UUG (ref 26–34)
MEAN CORPUSCULAR VOLUME: 96.6 UM3 (ref 80–100)
MEAN CORPUSCULAR VOLUME: 97.6 UM3 (ref 80–100)
MEAN PLATELET VOLUME: 12.4 UM3 (ref 9.4–12.4)
MEAN PLATELET VOLUME: 12.7 UM3 (ref 9.4–12.4)
MONOCYTES # (MANUAL): 1.5 T/MM3 (ref 0–0.8)
MONOCYTES % (MANUAL): 9 % (ref 0–9)
NEUTROPHILS #(MANUAL)-ABSOLUTE: 14 T/MM3 (ref 1.8–7.7)
NEUTROPHILS % (MANUAL): 86 % (ref 33–66)
POTASSIUM: 4.7 MEQ/L (ref 3.6–5)
POTASSIUM: 5.1 MEQ/L (ref 3.6–5)
RBC MORPH BLD: NORMAL
RDW STANDARD DEVIATION: 40.7 FL (ref 36.9–50.2)
RDW STANDARD DEVIATION: 41 FL (ref 36.9–50.2)
RED BLOOD COUNT: 3.39 M/MM3 (ref 4.5–5.9)
RED BLOOD COUNT: 3.56 M/MM3 (ref 4.5–5.9)
SODIUM SERPL-SCNC: 143 MEQ/L (ref 134–144)
SODIUM SERPL-SCNC: 145 MEQ/L (ref 134–144)
TOTAL CELLS COUNTED: 100 %
WBC - WHITE BLOOD COUNT: 14.2 T/MM3 (ref 4.5–11)
WBC - WHITE BLOOD COUNT: 16.3 T/MM3 (ref 4.5–11)

## 2017-03-29 RX ADMIN — CLINDAMYCIN PHOSPHATE SCH MLS/HR: 18 INJECTION, SOLUTION INTRAVENOUS at 00:41

## 2017-03-29 RX ADMIN — ACETAMINOPHEN SCH MG: 325 TABLET, FILM COATED ORAL at 13:37

## 2017-03-29 RX ADMIN — Medication SCH MG: at 09:03

## 2017-03-29 RX ADMIN — SODIUM CHLORIDE SCH MLS/HR: 0.9 INJECTION, SOLUTION INTRAVENOUS at 03:06

## 2017-03-29 RX ADMIN — FLUTICASONE PROPIONATE SCH SPRAY: 50 SPRAY, METERED NASAL at 08:40

## 2017-03-29 RX ADMIN — ACETAMINOPHEN SCH MG: 325 TABLET, FILM COATED ORAL at 09:04

## 2017-03-29 RX ADMIN — Medication SCH EACH: at 13:43

## 2017-03-29 RX ADMIN — DOCUSATE CALCIUM SCH MG: 240 CAPSULE, LIQUID FILLED ORAL at 08:41

## 2017-03-29 RX ADMIN — CLINDAMYCIN PHOSPHATE SCH MLS/HR: 18 INJECTION, SOLUTION INTRAVENOUS at 06:58

## 2017-03-29 RX ADMIN — Medication SCH EACH: at 06:23

## 2017-03-29 NOTE — NUR
DISCHARGE NURSING NOTE



PT WAS DISCHARGED FROM THE HOSPITAL AT THIS TIME, VIA WHEELCHAIR, ACCOMPANIED BY WIFE. PT 
ALERT AND ORIENTED X3. VITAL SIGNS STABLE ON RA. THIS RN WENT OVER DISCHARGE PAPERWORK WITH 
THE PT AND HIS WIFE INCLUDING DISCHARGE DIET, ACTIVITY, MEDICATIONS, FOLLOW-UP APPOINTMENTS 
AND INCISION CARE. PT VERBALIZED UNDERSTANDING. PT WAS GIVEN ORIGINALS OF THREE 
PRESCRIPTIONS. IV SITE TO THE LEFT HAND WAS DISCONTINUED BY THIS RN, IV STARTED BY ANOTHER 
RN, IV START NOT DOCUMENTED. CATHETER FROM THE LEFT HAND IV SITE INTACT. PT BELONGINGS SENT 
WITH, ID BAND REMOVED. NO CONCERNS NOTED AT TIME OF DISCHARGE.

## 2017-03-29 NOTE — DSPDOC
General


Date


Date


DATE: 3/29/17  TIME: 16:45


Attending Physician


Petey Hernández MD


Admitting Physician


Petey Hernández MD


Consulting Physician





Admitting Diagnosis


PRIMARY DEGENERATIVE JOINT DISEASE RIGHT HIP


Discharge Diagnosis


Primary DJD right hip


Procedures


Right total hip arthroplasty





History of Present Illness


HPI Elements


This patient was admitted for elective surgical tx of end stage degenerative 

joint disease that failed to respond to conservative treatment. Further details 

of this is found in the admission H&P.





Hospital Course








After appropriate preoperative clearance and signing of operative consent, the 

patient was given IV antibiotics, according to orthopedic protocol. The patient 

was taken to the operating room and underwent elective right total hip 

arthroplasty.





Following surgery, antibiotics were discontinued less than 24 hours according 

to joint protocol. Aspirin was initiated and SCDs added for DVT prevention. The 

dressing was clean, dry, and intact. Pain control was obtained via multimodal 

approach. Bowel motivation addressed with scheduled and PRN medications. 





His BP was a little low the night of surgery with ranges from 80-90 systolic 

and diastolic readings in the 50's. His Creat was 1.7 the day of surgery and 

increased to 1.9 by discharge, however, his baseline is about 2.0 and his GFR 

was actually better than his pre op levels.  I did give him some additional IV 

NS for his low BPs and renal status.    His K+ was normal by discharge.





 Early mobilization was initiated through PT services. Discharge arrangements 

made by a collaborative effort between the patient and Case Management.





Follow-up is scheduled in 2-3 weeks. Discharge instructions given by orthopedic 

providers and nursing staff at discharge. Discharge condition was good.


Pt was asked to see his PCP in 1 week to f/u on his renal function and general 

post op eval for his other medical problems.


Problems:  


(1) Degenerative arthritis of hip


Status:  Chronic


Assessment & Plan:  x


S/P TAM 3/28


Resume aspirin


SCDs, early mobilization, 





(2) Chronic kidney disease (CKD)


Status:  Chronic


Assessment & Plan:  Mr Melo is about at his baseline with renal function.  

His pre op creat was 2.0


His K+ is just up a little at 5.1 but was back to normal at discharge.


His BPs were a little low overnight ( 80s-90s / 50s)  Improved to 120/60s at 

discharge.


We will avoid use of NSAIDs.


I will give him a bolus and then run IV fluids at 100cc / hr.  Recheck labs 

this afternoon.


If labs and BPs improve, he could possibly go home.





(3) Atrial fibrillation


Status:  Chronic


Assessment & Plan:  HR and rhy is stable this AM.


Resume meds.  Monitor.





Ongoing Care Required?:  No


Laboratory





Laboratory Tests








Test


  3/29/17


06:02 3/29/17


15:58


 


White Blood Count 14.2T/MM3  16.3T/MM3 


 


Red Blood Count 3.56M/MM3  3.39M/MM3 


 


Hemoglobin 11.1GM/DL  10.6GM/DL 


 


Hematocrit 34.4%  33.1% 


 


Mean Corpuscular Volume 96.6UM3  97.6UM3 


 


Mean Corpuscular Hemoglobin 31.2UUG  31.3UUG 


 


Mean Corpuscular Hemoglobin


Concent 32.3GM/DL 


  32.0GM/DL 


 


 


RDW Standard Deviation 40.7FL  41.0FL 


 


Platelet Count 163T/MM3  149T/MM3 


 


Mean Platelet Volume 12.4UM3  12.7UM3 


 


Turbidity < 20  < 20 


 


Sodium Level 143MEQ/L  145MEQ/L 


 


Potassium Level 5.1MEQ/L  4.7MEQ/L 


 


Chloride Level 112MEQ/L  113MEQ/L 


 


Carbon Dioxide Level 20MEQ/L  22MEQ/L 


 


Anion Gap 11MEQ/L  10MEQ/L 


 


Blood Urea Nitrogen 39.0MG/DL  39.0MG/DL 


 


Creatinine 1.8MG/DL  1.9MG/DL 


 


Glomerular Filtration Rate


Calc 36 


  34 


 


 


BUN/Creatinine Ratio 22RATIO  21RATIO 


 


Glucose Level 134MG/DL  124MG/DL 


 


Calculated Osmolality 286MOSM/KG  289MOSM/KG 


 


Calcium Level 8.4MG/DL  8.6MG/DL 


 


Icterus Index < 2  < 2 


 


Chemistry Specimen Hemolysis < 15  < 15 


 


Immature Granulocyte % (Auto)  % 


 


Neutrophils (%) (Auto)  % 


 


Lymphocytes (%) (Auto)  % 


 


Monocytes (%) (Auto)  % 


 


Eosinophils (%) (Auto)  % 


 


Basophils (%) (Auto)  % 


 


Absolute Immature Granulocyte


(auto 


  T/MM3 


 


 


Absolute Neutrophils (auto)  T/MM3 


 


Absolute Lymphocytes (auto)  T/MM3 


 


Absolute Monocytes (auto)  T/MM3 


 


Absolute Eosinophils (auto)  T/MM3 


 


Absolute Basophils (auto)  T/MM3 








Home Meds


Active Scripts


Polyethylene Glycol 3350 (Healthylax) 17 Gm Powd.pack, 17 G PO DAILY, #30 PACKET


   Prov:PHYLLIS PARHAM         3/29/17


Tramadol HCl (Ultram) 50 Mg Tablet,  MG PO Q4H Y for PAIN, #60 TAB


   Prov:PHYLLIS PARHAM         3/29/17


Aspirin *EC* (Aspirin EC) 325 Mg Tablet.dr, 325 MG PO BID, #84 TAB


   Prov:PHYLLIS PARHAM         3/29/17


Reported Medications


Loratadine (Loratadine) 10 Mg Tablet, 10 MG PO ACB, TAB


   Take 1 tablet, by mouth, one time a day (before breakfast).


   2/27/17


Fluticasone Propionate (Flonase Allergy Relief 50 mcg/actuation Nasal) 9.9 Ml 

Spray.susp, 1 SPRAY EA NOSTRIL BID


   2/27/17


Metoprolol Tartrate (Metoprolol Tartrate) 25 Mg Tablet, 25 MG PO BIDWM, TAB


   Take 1 tab, by mouth, two time a day with meals.


   2/27/17


Levothyroxine Sodium (Levothyroxine Sodium) 100 Mcg Tablet, 1 TAB PO ACB, TAB


   Once daily before breakfast


   2/27/17


Mirtazapine (Mirtazapine) 30 Mg Tablet, 30 MG PO HS, TAB


   Take 1 tablet, by mouth, one time a day (at bedtime).


   2/27/17


Amiodarone HCl (Amiodarone HCl) 200 Mg Tablet, 100 MG PO DAILY, TAB


   2/27/17


Omega-3S/Dha/Epa/Fish Oil (Fish Oil 1,200 mg Softgel) 1 Each Capsule, 1200 MG 

PO DAILY, CAP


   2/27/17


Ascorbate Calcium (Vitamin C) 500 Mg Tablet, 1 TAB PO DAILY, TAB


   2/27/17


Docusate Calcium (Stool Softener) 240 Mg Capsule, 1 CAP PO BID, CAP


   2/27/17


Pravastatin Sodium (Pravastatin Sodium) 40 Mg Tablet, 20 MG PO HS, TAB


   Take 1 tablet, by mouth, daily at bedtime.


   2/27/17


Omeprazole (Omeprazole) 20 Mg Capsule.dr, 20 MG PO ACB, CAP


   Take 1 capsule, by mouth, one time a day (before


   breakfast).


   2/27/17


Tamsulosin HCl (Tamsulosin HCl) 0.4 Mg Cap.er.24h, 0.4 MG PO HS, CAP


   Take 1 capsule, by mouth, 1 time a day (at BEDTIME).


   2/27/17


Discontinued Reported Medications


Ibuprofen (Ibuprofen) 200 Mg Tablet, 2 TAB PO Q4H Y for PAIN, TAB


   3/27/17


Aspirin *EC* (Low Dose Aspirin EC) 81 Mg Tablet., 1 TAB PO DAILY, TAB


   2/27/17


Discharge Disposition


Please refer to Case Management Notes for patient's disposition.


Estimated Blood Loss


100.0











YESSY SMALL Mar 29, 2017 16:49

## 2017-03-29 NOTE — NUR
Summary

Pt has been AOx3, wife stayed the night in the recliner at bedside. Pt has rated pain at a 4 
most of the night. When pain was 6/10 pt requested 50mg Ultram. Pt stated "I don't like to 
take more pain medicine than I have to". Pt was up and walked the reddy before bed. Pt 
ambulated well with fww and gb.\

## 2017-03-29 NOTE — NUR
CM



THIS WORKER MET WITH PT AT THIS TIME. PT SITTING ON SIDE OF THE BED AND WIFE AT BEDSIDE. 
THIS WORKER REVIEWED DISCHARGE PLAN. PT PLANNING TO RETURN HOME WITH WIFE. DENIED ANY NEEDS 
AT THIS TIME. HIP KIT PROVIDED TO PT. PT HAS FWW. WILL COMPLETE PHYSICAL THERAPY IN [place] 
(APPOINTMENT TIME IS 3/31/17 AT 9AM). PT WAS GIVEN THIS WORKER'S CONTACT INFORMATION AND 
ENCOURAGED TO CONTACT THIS WORKER WITH ANY NEEDS.

## 2017-03-29 NOTE — NUR
Pt A/O x3.  Speech is clear, able to voice needs, cooperative with cares.  Educated on 
medications this am. Verbalizes understanding, needs reinforcement.  Lung fields CTA, 
respirations even, unlabored. VS WNL.  No s/s of distress.  Normoactive BS x4 quads, abd 
soft.  Able to feed self. Up with assist x1, gait belt, FWW.  Ice pack to right hip as 
needed. New Iv side left hand, patent

## 2017-03-29 NOTE — NUR
ASSUMED PT CARE



THIS RN ASSUMED PT CARE AT THIS TIME. PT ALERT AND ORIENTED X3. VITAL SIGNS STABLE, ON RA. 
WILL CONTINUE TO MONITOR.

## 2017-03-29 NOTE — PDORTHOPN
Subjective


Date


DATE: 3/29/17  TIME: 08:32


Subjective


Mr Melo is doing very well.


His pain is controlled.


They have had trouble getting IV access on him.


No CP or breathing issues.


He has been mobile with good tolerance.





Objective


Vital Signs


Vital signs





 Vital Signs








 3/28/17 3/29/17 3/29/17





 22:04 00:00 03:58


 


Temp  97.8 98.2


 


Pulse 73 62 62


 


Resp 16 16 16


 


B/P 112/65 105/69 106/60


 


Pulse Ox 93 95 94


 


O2 Delivery Room Air Room Air Room Air








Height (Feet):  6


Height (Inches):  0.00


Weight (Kilograms):  80.500





General


General Appearance:  No Acute Distress





Respiratory (Brief)


Respiratory Brief:  FOUND: non-labored





Cardiovascular (Brief)


Cardiac:  FOUND: calf easily compressible, calf soft, nontender, pedal pulses 

intact





Surgical Site


Incision:  FOUND: Mepilex dressing intact, no drainage





Neurologic (Brief)


Neurological Brief:  FOUND: extremities w/o deficits, neuro intact





Psychiatric (Brief)


Psychiatric Brief:  FOUND: alert, no acute distress





Laboratory


Laboratory


Laboratory Tests


3/28/17 11:04








3/29/17 06:02








Laboratory Tests


3/28/17 11:04








3/29/17 06:02














Assessment & Plan


Problems:  


(1) Degenerative arthritis of hip


Status:  Chronic


Assessment & Plan:  x


S/P TAM 3/28


Resume aspirin


SCDs, early mobilization, 





(2) Chronic kidney disease (CKD)


Status:  Chronic


Assessment & Plan:  Mr Melo is about at his baseline with renal function.  

His pre op creat was 2.0


His K+ is just up a little at 5.1


His BPs were a little low overnight ( 80s-90s / 50s) 


We will avoid use of NSAIDs.


I will give him a bolus and then run IV fluids at 100cc / hr.  Recheck labs 

this afternoon.


If labs and BPs improve, he could possibly go home but I might want to watch 

him overnight and recheck labs in the AM.





(3) Atrial fibrillation


Status:  Chronic


Assessment & Plan:  HR and rhy is stable this AM.


Resume meds.  Monitor.








Hospital Course Summary


Disclaimer


The visit summary below is not to be considered part of the above Progress Note.











YESSY SMALL Mar 29, 2017 08:36

## 2020-05-29 ENCOUNTER — HOSPITAL ENCOUNTER (OUTPATIENT)
Dept: HOSPITAL 19 - ZCOL.LAB | Age: 85
End: 2020-05-29
Attending: OTOLARYNGOLOGY
Payer: MEDICARE

## 2020-05-29 DIAGNOSIS — J32.0: Primary | ICD-10-CM
